# Patient Record
(demographics unavailable — no encounter records)

---

## 2024-11-01 NOTE — ASSESSMENT
[FreeTextEntry1] : Advised the patient of the etiology and treatment of pruritus ani.  Recommendations including appropriate perianal hygiene changes, avoidance of soaps and astringents, lubricating lotions and avoidance of excessive wiping detailed.  Advised increasing fiber regimen.  Recommend trial of Aquaphor lotion.

## 2024-11-01 NOTE — HISTORY OF PRESENT ILLNESS
[FreeTextEntry1] : 49 y/o M presents for initial evaluation of hemorrhoids.    PMHx: HLD  PSHx: bilateral bunion sx  Medications: denies  Allergies: NKDA  Denies hx of CRC/IBD  Smoker: denies   12/28/23 Colonoscopy with Dr. Rodney Gary, BP good Impressions: Two 4 to 6 mm polyps in the sigmoid colon, removed with a jumbo cold forceps. Resected and retrieved.  The examined portion of the ileum was normal.   Patient presents today for hemorrhoids. Has noticed slight prolapsed tissue that cannot be reduced with rectal bleeding.  Symptoms first started about two years ago.  About once a month, patient will notice slight bright red blood on toilet paper after a bowel movement.  Will always feel itchy and irritation after wiping. Admits to over wipe.  Currently using preparation H which helps relieve symptoms.   Admits to high caffeine intake.  Social ETOH use.  Denies eating spicy food every day.   Bowel movement is usually once a day. Stool is soft and formed. Denies constipation.  Denies using stool softener, fiber supplement.   Denies taking any recent NSAIDS/blood thinner.

## 2024-11-01 NOTE — PHYSICAL EXAM
[Excoriation] : no perianal excoriation [Normal] : was normal [None] : there was no rectal mass  [de-identified] : Mildly thickened fold along the anterior perineum/anal verge. [FreeTextEntry1] : Medical assistant was present for the entire exam.  Anoscopy was performed for evaluation of the patients rectal bleeding  history . The risks, benefits and alternatives were reviewed.  A lighted anoscope was passed into the anal canal and the entire anal mucosal surface was inspected..   The findings revealed moderate internal hemorrhoids. No masses or lesions were identified.